# Patient Record
(demographics unavailable — no encounter records)

---

## 2025-04-17 NOTE — PHYSICAL EXAM
[Well Developed] : well developed [Normal Conjunctiva] : normal conjunctiva [Normal Venous Pressure] : normal venous pressure [No Carotid Bruit] : no carotid bruit [Normal S1, S2] : normal S1, S2 [No Murmur] : no murmur [Clear Lung Fields] : clear lung fields [Soft] : abdomen soft [No Edema] : no edema [No Rash] : no rash [Moves all extremities] : moves all extremities [No Focal Deficits] : no focal deficits [de-identified] : Pleasant

## 2025-04-17 NOTE — REVIEW OF SYSTEMS
[Rash] : rash [Negative] : Psychiatric [FreeTextEntry4] : tinnitus [FreeTextEntry7] : colonoscopy 2 yrs ago [de-identified] : tinea versicolor (recent dermatologist skin biopsy)

## 2025-04-17 NOTE — HISTORY OF PRESENT ILLNESS
[FreeTextEntry1] : Mr Cornelius is a 45 y.o. man here for first [facility] visit with history of hyperlipidemia. He was started on medication for cholesterol 5 years ago. He discontinued rosuvastatin several months ago as it was creating general muscle ache. These aches resolved when he stopped the med. He has no other medical problem except for tinea versicolor. There is a family history of CAD. His father had onset of angina in his 40 years of age and  of MI at 72 years. The patient had a cardiac CTA 2024 with calcium score 22.

## 2025-04-17 NOTE — ASSESSMENT
[FreeTextEntry1] : -------------------------------------------------- ECG - NSR, rate 60, +82. WNL Obtain labs Resume hyperlipidemia Rx PRN